# Patient Record
Sex: MALE | Race: WHITE | NOT HISPANIC OR LATINO | URBAN - METROPOLITAN AREA
[De-identification: names, ages, dates, MRNs, and addresses within clinical notes are randomized per-mention and may not be internally consistent; named-entity substitution may affect disease eponyms.]

---

## 2022-02-07 ENCOUNTER — EMERGENCY (EMERGENCY)
Facility: HOSPITAL | Age: 27
LOS: 1 days | Discharge: HOME | End: 2022-02-07
Attending: EMERGENCY MEDICINE | Admitting: EMERGENCY MEDICINE
Payer: COMMERCIAL

## 2022-02-07 VITALS
SYSTOLIC BLOOD PRESSURE: 151 MMHG | DIASTOLIC BLOOD PRESSURE: 92 MMHG | HEART RATE: 99 BPM | RESPIRATION RATE: 18 BRPM | TEMPERATURE: 98 F | WEIGHT: 130.07 LBS | OXYGEN SATURATION: 100 %

## 2022-02-07 VITALS
HEART RATE: 80 BPM | SYSTOLIC BLOOD PRESSURE: 141 MMHG | RESPIRATION RATE: 17 BRPM | OXYGEN SATURATION: 98 % | DIASTOLIC BLOOD PRESSURE: 88 MMHG

## 2022-02-07 DIAGNOSIS — R00.2 PALPITATIONS: ICD-10-CM

## 2022-02-07 DIAGNOSIS — R00.0 TACHYCARDIA, UNSPECIFIED: ICD-10-CM

## 2022-02-07 DIAGNOSIS — Z86.79 PERSONAL HISTORY OF OTHER DISEASES OF THE CIRCULATORY SYSTEM: ICD-10-CM

## 2022-02-07 LAB
ALBUMIN SERPL ELPH-MCNC: 4.6 G/DL — SIGNIFICANT CHANGE UP (ref 3.3–5)
ALP SERPL-CCNC: 100 U/L — SIGNIFICANT CHANGE UP (ref 40–120)
ALT FLD-CCNC: SIGNIFICANT CHANGE UP U/L (ref 10–45)
ANION GAP SERPL CALC-SCNC: 13 MMOL/L — SIGNIFICANT CHANGE UP (ref 5–17)
AST SERPL-CCNC: SIGNIFICANT CHANGE UP U/L (ref 10–40)
BASOPHILS # BLD AUTO: 0.07 K/UL — SIGNIFICANT CHANGE UP (ref 0–0.2)
BASOPHILS NFR BLD AUTO: 0.7 % — SIGNIFICANT CHANGE UP (ref 0–2)
BILIRUB SERPL-MCNC: 0.7 MG/DL — SIGNIFICANT CHANGE UP (ref 0.2–1.2)
BUN SERPL-MCNC: 8 MG/DL — SIGNIFICANT CHANGE UP (ref 7–23)
CALCIUM SERPL-MCNC: 9.5 MG/DL — SIGNIFICANT CHANGE UP (ref 8.4–10.5)
CHLORIDE SERPL-SCNC: 104 MMOL/L — SIGNIFICANT CHANGE UP (ref 96–108)
CO2 SERPL-SCNC: 25 MMOL/L — SIGNIFICANT CHANGE UP (ref 22–31)
CREAT SERPL-MCNC: 0.77 MG/DL — SIGNIFICANT CHANGE UP (ref 0.5–1.3)
EOSINOPHIL # BLD AUTO: 0.09 K/UL — SIGNIFICANT CHANGE UP (ref 0–0.5)
EOSINOPHIL NFR BLD AUTO: 0.9 % — SIGNIFICANT CHANGE UP (ref 0–6)
GLUCOSE SERPL-MCNC: 87 MG/DL — SIGNIFICANT CHANGE UP (ref 70–99)
HCT VFR BLD CALC: 50.1 % — HIGH (ref 39–50)
HGB BLD-MCNC: 17.3 G/DL — HIGH (ref 13–17)
IMM GRANULOCYTES NFR BLD AUTO: 0.3 % — SIGNIFICANT CHANGE UP (ref 0–1.5)
LYMPHOCYTES # BLD AUTO: 1.29 K/UL — SIGNIFICANT CHANGE UP (ref 1–3.3)
LYMPHOCYTES # BLD AUTO: 12.6 % — LOW (ref 13–44)
MCHC RBC-ENTMCNC: 31.7 PG — SIGNIFICANT CHANGE UP (ref 27–34)
MCHC RBC-ENTMCNC: 34.5 GM/DL — SIGNIFICANT CHANGE UP (ref 32–36)
MCV RBC AUTO: 91.9 FL — SIGNIFICANT CHANGE UP (ref 80–100)
MONOCYTES # BLD AUTO: 0.48 K/UL — SIGNIFICANT CHANGE UP (ref 0–0.9)
MONOCYTES NFR BLD AUTO: 4.7 % — SIGNIFICANT CHANGE UP (ref 2–14)
NEUTROPHILS # BLD AUTO: 8.28 K/UL — HIGH (ref 1.8–7.4)
NEUTROPHILS NFR BLD AUTO: 80.8 % — HIGH (ref 43–77)
NRBC # BLD: 0 /100 WBCS — SIGNIFICANT CHANGE UP (ref 0–0)
PLATELET # BLD AUTO: 348 K/UL — SIGNIFICANT CHANGE UP (ref 150–400)
POTASSIUM SERPL-MCNC: SIGNIFICANT CHANGE UP MMOL/L (ref 3.5–5.3)
POTASSIUM SERPL-SCNC: SIGNIFICANT CHANGE UP MMOL/L (ref 3.5–5.3)
PROT SERPL-MCNC: 7.7 G/DL — SIGNIFICANT CHANGE UP (ref 6–8.3)
RBC # BLD: 5.45 M/UL — SIGNIFICANT CHANGE UP (ref 4.2–5.8)
RBC # FLD: 13.1 % — SIGNIFICANT CHANGE UP (ref 10.3–14.5)
SODIUM SERPL-SCNC: 142 MMOL/L — SIGNIFICANT CHANGE UP (ref 135–145)
TROPONIN T SERPL-MCNC: <0.01 NG/ML — SIGNIFICANT CHANGE UP (ref 0–0.01)
WBC # BLD: 10.24 K/UL — SIGNIFICANT CHANGE UP (ref 3.8–10.5)
WBC # FLD AUTO: 10.24 K/UL — SIGNIFICANT CHANGE UP (ref 3.8–10.5)

## 2022-02-07 PROCEDURE — 80053 COMPREHEN METABOLIC PANEL: CPT

## 2022-02-07 PROCEDURE — 99283 EMERGENCY DEPT VISIT LOW MDM: CPT

## 2022-02-07 PROCEDURE — 85025 COMPLETE CBC W/AUTO DIFF WBC: CPT

## 2022-02-07 PROCEDURE — 93005 ELECTROCARDIOGRAM TRACING: CPT

## 2022-02-07 PROCEDURE — 36415 COLL VENOUS BLD VENIPUNCTURE: CPT

## 2022-02-07 PROCEDURE — 99285 EMERGENCY DEPT VISIT HI MDM: CPT

## 2022-02-07 PROCEDURE — 84484 ASSAY OF TROPONIN QUANT: CPT

## 2022-02-07 RX ORDER — SODIUM CHLORIDE 9 MG/ML
1000 INJECTION INTRAMUSCULAR; INTRAVENOUS; SUBCUTANEOUS ONCE
Refills: 0 | Status: COMPLETED | OUTPATIENT
Start: 2022-02-07 | End: 2022-02-07

## 2022-02-07 RX ADMIN — SODIUM CHLORIDE 1000 MILLILITER(S): 9 INJECTION INTRAMUSCULAR; INTRAVENOUS; SUBCUTANEOUS at 18:00

## 2022-02-07 NOTE — ED PROVIDER NOTE - NSFOLLOWUPINSTRUCTIONS_ED_ALL_ED_FT
Follow up with cardiologist recommended below.    Stay hydrated and eat well balanced meals.    Return to ED with worsening symptoms or other concerns.          Heart Palpitations    WHAT YOU NEED TO KNOW:    Heart palpitations are feelings that your heart races, jumps, throbs, or flutters. You may feel extra beats, no beats for a short time, or skipped beats. You may have these feelings in your chest, throat, or neck. They may happen when you are sitting, standing, or lying. Heart palpitations may be frightening, but are usually not caused by a serious problem.     DISCHARGE INSTRUCTIONS:    Call 911 or have someone else call for any of the following:   •You have any of the following signs of a heart attack: ?Squeezing, pressure, or pain in your chest      ?You may also have any of the following: ?Discomfort or pain in your back, neck, jaw, stomach, or arm      ?Shortness of breath      ?Nausea or vomiting      ?Lightheadedness or a sudden cold sweat        •You have any of the following signs of a stroke: ?Numbness or drooping on one side of your face       ?Weakness in an arm or leg      ?Confusion or difficulty speaking      ?Dizziness, a severe headache, or vision loss      •You faint or lose consciousness.       Seek care immediately if:   •Your palpitations happen more often or last longer than usual.       •You have palpitations and shortness of breath, nausea, sweating, or dizziness.       Contact your healthcare provider if:   •You have questions or concerns about your condition or care.          Follow up with your healthcare provider as directed: You may need to follow up with a cardiologist. You may need tests to check for heart problems that cause palpitations. Write down your questions so you remember to ask them during your visits.     Keep a record: Write down when your palpitations start and stop, what you were doing when they started, and your symptoms. Keep track of what you ate or drank within a few hours of your palpitations. Include anything that seemed to help your symptoms, such as lying down or holding your breath. This record will help you and your healthcare provider learn what triggers your palpitations. Bring this record with you to your follow up visits.    Help prevent heart palpitations:   •Manage stress and anxiety. Find ways to relax such as listening to music, meditating, or doing yoga. Exercise can also help decrease stress and anxiety. Talk to someone you trust about your stress or anxiety. You can also talk to a therapist.       •Get plenty of sleep every night. Ask your healthcare provider how much sleep you need each night.       •Do not drink caffeine or alcohol. Caffeine and alcohol can make your palpitations worse. Caffeine is found in soda, coffee, tea, chocolate, and drinks that increase your energy.       •Do not smoke. Nicotine and other chemicals in cigarettes and cigars may damage your heart and blood vessels. Ask your healthcare provider for information if you currently smoke and need help to quit. E-cigarettes or smokeless tobacco still contain nicotine. Talk to your healthcare provider before you use these products.       •Do not use illegal drugs. Talk to your healthcare provider if you use illegal drugs and want help to quit.          © Copyright [employer] 2022           back to top                          © Copyright [employer] 2022

## 2022-02-07 NOTE — ED PROVIDER NOTE - PATIENT PORTAL LINK FT
You can access the FollowMyHealth Patient Portal offered by [facility] by registering at the following website: [email or web address]. By joining [employer]’s FollowMyHealth portal, you will also be able to view your health information using other applications (apps) compatible with our system.

## 2022-02-07 NOTE — ED PROVIDER NOTE - MUSCULOSKELETAL, MLM
Spine appears normal, range of motion is not limited, no muscle or joint tenderness, anterior R knee swelling, no distal neurovascular deficit, no calf tenderness.
Discharged

## 2022-02-07 NOTE — ED ADULT NURSE NOTE - NSIMPLEMENTINTERV_GEN_ALL_ED
Wound Care: Petrolatum Implemented All Universal Safety Interventions:  [place] to call system. Call bell, personal items and telephone within reach. Instruct patient to call for assistance. Room bathroom lighting operational. Non-slip footwear when patient is off stretcher. Physically safe environment: no spills, clutter or unnecessary equipment. Stretcher in lowest position, wheels locked, appropriate side rails in place.

## 2022-02-07 NOTE — ED PROVIDER NOTE - CARE PROVIDER_API CALL
Ad Hong  CARDIOLOGY  [address] [place] [address]  Phone: [phone]  Fax: [phone]  Follow Up Time:

## 2022-02-07 NOTE — ED ADULT TRIAGE NOTE - CHIEF COMPLAINT QUOTE
pt was at home and felt his heart started to race suddenly , happened 3 times , no CP /SOB , denies any recent alcohol/drug use

## 2022-02-07 NOTE — ED PROVIDER NOTE - OBJECTIVE STATEMENT
26 y/o M pt with PMHx of SVT 4years ago presents to ED c/o palpitations today. Pt relates his first time having SVT was when he was in college and started to have rapid heart rate; pt was taken by EMS to hospital where it was resolved. He had wore a Holter with no results, and it was judged most likely to be SVT, although it hasn't happened since. Today, pt was at work, sitting at his desk, when he had sudden onset of rapid heart rate that lasted 10min then resolved, and it is completely resolved by the time pt arrived to ED. He has recent surgery to his R knee ACL, but denies swelling, pleuritic chest pain, shortness of breath, hx of PE or CAD, or any other acute complaints. Pt occasionally drinks, but denies smoking or any drug use.

## 2022-02-07 NOTE — ED PROVIDER NOTE - CARE PLAN
[facility] you [facility]    Interested in decreasing your wait time in the Walk-in/Urgent Care Clinic?  Same day reservations can now be made on line.  Go to [email or web address] to see the wait times at each [facility] and to make a reservation at the site that is most convenient for you. We will do our best to honor your reservation time, but please understand that wait times are subject to change once you arrive at the clinic.    It is difficult to recognize all elements of any illness or injury in a single visit. The examination, treatment, and x-rays received are on a preliminary basis only. A radiologist will also review your x-rays for final reading and you will be notified if the final reading is different than the preliminary reading. Call your primary care provider if you have questions or problems before your next appointment.  If symptoms worsen or do not resolve please follow-up with your Primary Care Provider (PCP), Urgent Care or the nearest  Emergency Room for emergency symptoms.  If you are unsure of whom to follow up with, call [phone] and ask to speak with either your PCP, the Urgent Care Nurse or the on-call provider if you are calling after hours.      If you are referred to a specialist or scheduled for a test, our referrals department will call you with your appointment date and time within 3 business days. If you have not heard from them in this time frame, please call [phone] and ask for the referrals department.     Test results: Unless otherwise instructed, you should be notified of test results within one week. Please call our office if you do not hear from us within this time frame at [phone].   Hours:   Sunday-Saturday:  8:00 am to 8:00 pm  Holidays: 8:00 am to 4:00 pm  Help us to grow our quality of service! We want to improve - and you can help!  You may receive a survey in the  mail or via e-mail. This is your opportunity to tell us what excellent service you received, and where we could use improvement.  We value your input!     Thank you again for visiting [facility].  Tammy L Schladweiler, NP  FACT SHEET FOR PATIENTS, PARENTS AND CAREGIVERS  EMERGENCY USE AUTHORIZATION (EUA) OF REGEN-COVTM  (casirivimab and imdevimab) FOR CORONAVIRUS DISEASE 2019 (COVID-19)  You are being given a medicine called REGEN-COV (casirivimab and imdevimab) for the  treatment or post-exposure prevention of coronavirus disease 2019 (COVID-19). SARS-CoV-2  is the virus that causes COVID-19. This Fact Sheet contains information to help you understand  the potential risks and potential benefits of taking REGEN-COV.  Receiving REGEN-COV may benefit certain people with COVID-19 and may help prevent  certain people who have been exposed to someone who is infected with SARS-CoV-2 from  getting SARS-CoV-2 infection, or may prevent certain people who are at high risk of exposure to  someone who is infected with SARS-CoV-2 from getting SARS-CoV-2 infection.  Read this Fact Sheet for information about REGEN-COV. Talk to your healthcare provider if  you have questions. It is your choice to receive REGEN-COV or stop at any time.  WHAT IS COVID-19?  COVID-19 is caused by a virus called a coronavirus, SARS-CoV-2. People can get COVID-19  through contact with another person who has the virus.  COVID-19 illnesses have ranged from very mild (including some with no reported symptoms) to  severe, including illness resulting in death. While information so far suggests that most  COVID-19 illness is mild, serious illness can happen and may cause some of your other medical  conditions to become worse. People of all ages with severe, long-lasting (chronic) medical  conditions like heart disease, lung disease, and diabetes, for example, and other conditions  including obesity, seem to be at higher risk of being  hospitalized for COVID-19. Older age, with  or without other conditions, also places people at higher risk of being hospitalized for COVID19.  WHAT ARE THE SYMPTOMS OF COVID-19?  The symptoms of COVID-19 include fever, cough, and shortness of breath, which may appear 2  to 14 days after exposure. Serious illness including breathing problems can occur and may cause  your other medical conditions to become worse.  WHAT IS REGEN-COV (casirivimab and imdevimab)?  REGEN-COV is an investigational medicine used in adults and adolescents (12 years of age and  older who weigh at least 88 pounds (40 kg)) who are at high risk for severe COVID-19,  including hospitalization or death for:  • treatment of mild to moderate symptoms of COVID-19  • post-exposure prevention of COVID-19 in persons who are:  o not fully vaccinated against COVID-19 (Individuals are considered to be fully  vaccinated 2 weeks after their second vaccine dose in a 2-dose series [such as the  Pfizer or Moderna vaccines], or 2 weeks after a single-dose vaccine [such as  [employer] Jose Manuel vaccine]), or,  o are not expected to build up enough of an immune response to the complete  COVID-19 vaccination (for example, someone with immunocompromising   Page 2 of 5  conditions, including someone who is taking immunosuppressive medications),  and  - have been exposed to someone who is infected with SARS-CoV-2. Close  contact with someone who is infected with SARS-CoV-2 is defined as  being within 6 feet for a total of 15 minutes or more, providing care at  home to someone who is sick, having direct physical contact with the  person (hugging or kissing, for example), sharing eating or drinking  utensils, or being exposed to respiratory droplets from an infected person  (sneezing or coughing, for example). For additional details, go to  https://www.cdc.gov/coronavirus/2019-ncov/if-you-aresick/quarantine.html, or  - someone who is at high risk of being exposed  to someone who is infected  with SARS-CoV-2 because of occurrence of SARS-CoV-2 infection in  other individuals in the same institutional setting (for example, as nursing  homes, prisons,).  REGEN-COV is investigational because it is still being studied. There is limited information  known about the safety and effectiveness of using REGEN-COV to treat people with COVID-19  or to prevent COVID-19 in people who are at high risk of being exposed to someone who is  infected with SARS-CoV-2. REGEN-COV is not authorized for pre-exposure prophylaxis for  prevention of COVID-19.  The FDA has authorized the emergency use of REGEN-COV for the treatment of COVID-19  and the post-exposure prevention of COVID-19 under an Emergency Use Authorization (EUA).  For more information on EUA, see the “What is an Emergency Use Authorization (EUA)?”  section at the end of this Fact Sheet.  WHO SHOULD NOT TAKE REGEN-COV?  Do not take REGEN-COV if you have had a severe allergic reaction to REGEN-COV.  WHAT SHOULD I TELL MY HEALTH CARE PROVIDER BEFORE I RECEIVE  REGEN-COV?  Tell your healthcare provider about all of your medical conditions, including if you:  • Have any allergies  • Have had a severe allergic reaction including anaphylaxis to REGEN-COV previously  • Have received a COVID-19 vaccine.  • Have any serious illnesses  • Are pregnant or plan to become pregnant  • Are breastfeeding or plan to breastfeed  • Are taking any medications (prescription, over-the-counter, vitamins, and herbal  products)  HOW WILL I RECEIVE REGEN-COV (casirivimab and imdevimab)?  • REGEN-COV consists of two investigational medicines, casirivimab and imdevimab,  given together at the same time through a vein (intravenous or IV) or injected in the   Page 3 of 5  tissue just under the skin (subcutaneous injections). Your healthcare provider will  determine the most appropriate way for you to be given REGEN-COV.  • Treatment: If you are receiving an  intravenous infusion, the infusion will take 20 to 50  minutes or longer. Your healthcare provider will determine the duration of your infusion.  o If your healthcare provider determines that you are unable to receive REGENCOV as an intravenous infusion which would lead to a delay in treatment, then as  an alternative, REGEN-COV can be given in the form of subcutaneous injections.  If you are receiving subcutaneous injections, your dose will be provided as  multiple injections given in separate locations around the same time.  • Post-exposure prevention: If you are receiving subcutaneous injections, your dose will  be provided as multiple injections given in separate locations around the same time. If  you are receiving an intravenous infusion, the infusion will take 20 to 50 minutes or  longer.  o After the initial dose, if your healthcare provider determines that you need to  receive additional doses of REGEN-COV for ongoing protection, the additional  intravenous or subcutaneous doses would be administered monthly.  WHAT ARE THE IMPORTANT POSSIBLE SIDE EFFECTS OF REGEN-COV  (casirivimab and imdevimab)?  Possible side effects of REGEN-COV are:  • Allergic reactions. Allergic reactions can happen during and after infusion or injection of  REGEN-COV. Tell your healthcare provider right away or seek immediate medical  attention if you get any of the following signs and symptoms of allergic reactions: fever,  chills, nausea, headache, shortness of breath, low or high blood pressure, rapid or slow  heart rate, chest discomfort or pain, weakness, confusion, feeling tired, wheezing,  swelling of your lips, face, or throat, rash including hives, itching, muscle aches, feeling  faint, dizziness and sweating. These reactions may be severe or life threatening.  • Worsening symptoms after treatment: You may experience new or worsening symptoms  after infusion or injection, including fever, difficulty breathing, rapid or slow  heart rate,  tiredness, weakness or confusion. If these symptoms occur, contact your healthcare  provider or seek immediate medical attention as some of these symptoms have required  hospitalization. It is unknown if these symptoms are related to treatment or are due to the  progression of COVID-19.  The side effects of getting any medicine by vein may include brief pain, bleeding, bruising of the  skin, soreness, swelling, and possible infection at the infusion site. The side effects of getting any  medicine by subcutaneous injection may include pain, bruising of the skin, soreness, swelling,  and possible infection at the injection site.  These are not all the possible side effects of REGEN-COV. Not a lot of people have been given  REGEN-COV. Serious and unexpected side effects may happen. REGEN-COV is still being  studied so it is possible that all of the risks are not known at this time.  It is possible that REGEN-COV could interfere with your body's own ability to fight off a future  infection of SARS-CoV-2. Similarly, REGEN-COV may reduce your body’s immune response  to a vaccine for SARS-CoV-2. Specific studies have not been conducted to address these  possible risks. Talk to your healthcare provider if you have any questions.  Page 4 of 5  WHAT OTHER TREATMENT CHOICES ARE THERE?  Like REGEN-COV (casirivimab and imdevimab), FDA may allow for the emergency use of  other medicines to treat people with COVID-19. Go to https://www.fda.gov/emergencypreparedness-and-response/mcm-legal-regulatory-and-policy-framework/emergency-useauthorization for information on the emergency use of other medicines that are not approved by  FDA that are used to treat people with COVID-19. Your healthcare provider may talk with you  about clinical trials you may be eligible for.  It is your choice to be treated or not to be treated with REGEN-COV. Should you decide not to  receive REGEN-COV or stop it at any time, it will not change  your standard medical care.  WHAT OTHER PREVENTION CHOICES ARE THERE?  Vaccines to prevent COVID-19 are also available under Emergency Use Authorization. Use of  REGEN-COV does not replace vaccination against COVID-19. REGEN-COV is not authorized  for pre-exposure prophylaxis for prevention of COVID-19.  WHAT IF I AM PREGNANT OR BREASTFEEDING?  There is limited experience using REGEN-COV (casirivimab and imdevimab) in pregnant  women or breastfeeding mothers. For a mother and unborn baby, the benefit of receiving  REGEN-COV may be greater than the risk of using the product. If you are pregnant or  breastfeeding, discuss your options and specific situation with your healthcare provider.  HOW DO I REPORT SIDE EFFECTS WITH REGEN-COV (casirivimab and  imdevimab)?  Tell your healthcare provider right away if you have any side effect that bothers you or does not  go away.  Report side effects to FDA MedWatch at www.fda.gov/medwatch or call 5-352-IYS-1677 or  call 1-124.386.9006.  HOW CAN I LEARN MORE?  • Ask your health care provider.  • Visit www.[email or web address]  • Visit https://www.dozhb14xtmdrzawhjuegvxmcae.nih.gov/  • Contact your local or state public health department.  WHAT IS AN EMERGENCY USE AUTHORIZATION (EUA)?  The [place] FDA has made REGEN-COV (casirivimab and imdevimab) available under  an emergency access mechanism called an EUA. The EUA is supported by a [place] of [place]  and Human Service (HHS) declaration that circumstances exist to justify the emergency use of  drugs and biological products during the COVID-19 pandemic.  Page 5 of 5  REGEN-COV has not undergone the same type of review as an FDA-approved product. In  issuing an EUA under the COVID-19 public health emergency, the FDA must determine, among  other things, that based on the totality of scientific evidence available, it is reasonable to believe  that the product may be effective for diagnosing, treating, or preventing COVID-19, or a  serious  or life-threatening disease or condition caused by COVID-19; that the known and potential  benefits of the product, when used to diagnose, treat, or prevent such disease or condition,  outweigh the known and potential risks of such product; and that there are no adequate, approved  and available alternatives. All of these criteria must be met to allow for the medicine to be used  in the treatment of COVID-19 or prevention of COVID-19 during the COVID-19 pandemic.  The EUA for REGEN-COV is in effect for the duration of the COVID-19 declaration justifying  emergency use of these products, unless terminated or revoked (after which the products may no  longer be used).  Manufactured by:  [employer]  [address] [place] [id number]  ©2021 [employer] All rights reserved.  Revised: 07/2021      What is the Covid-19 Coronavirus?        Covid-19 is a new strain of the common cold virus called Coronavirus.  It has spread quickly around the world and has been found in most countries.  It has been considered a national emergency due to its fast spread and ability to make older people and those with diabetes, lung disease, and heart disease very sick.    What are the symptoms?    Fever, Cough, and Shortness of Breath are the 3 most common symptoms most patients with Covid-19 are experiencing.  Usually symptoms start 2-14 days after exposure to the virus.  Other symptoms can be similar to the common cold such as nasal congestion, runny nose, headache, and sore throat.     When should I seek medical care?  If you or your loved one is experiencing only the main three symptoms mentioned above, call the [facility] hotline at [phone] for advice and direction on what to do next.  Most young and healthy people with mild symptoms are able to stay home and treat symptomatically with the below treatment recommendations.  Most people will not need to be  tested and our hotline and clinics can help you determine if you need testing.      If you or a loved one experiences any of the following emergency warning signs seek care at your nearest emergency department.  • Difficulty breathing or shortness of breath that doesn't improve  • Persistent pain or pressure in the chest  • New confusion or inability to wake up  • Blue lips or face    How do I get it and how do I prevent it?  We believe the virus is spread from person to person in close contact (within 6 feet) via respiratory droplets from a sneeze or cough.  Kissing and touching infected surfaces with hand-to-mouth activity afterwards may also help spread the virus.      There is no vaccine to help prevent this virus.  Therefore, good hygiene, isolation, and living a healthy lifestyle is the best way to prevent infection as well as severe symptoms. Below are ways you can help prevent infection and the spread of the virus:  • Wash your hands (soap and water) regularly, especially before meals and after coming in contact with someone  • Cover your cough's and sneezes with your elbow or a tissue  • Avoid contact with people who are showing the common cold symptoms mentioned above  • Avoid large gatherings of 25+ people and only plan for necessary trips to stores and public places  • Quit smoking tobacco and limit alcohol use to a drink per day maximum due to the immune suppression of these habits  • Walk outside (if possible) or get other low intensity exercise daily as this has been shown to boost immunity  • Eat 2-3 fruits, 5-7 vegetables, a 1/2 cup of legumes, a cup of whole grains, and a handful of nuts every day along with 3-4oz of lean meat or fish every few days.  Limit sugar-filled, processed, and fried foods.  This approach to nutrition has shown to support the immune system best.    • Drink 60-75oz of water daily and avoid sodas, sweetened teas, juices, and other sugar containing drinks which have been shown  to suppress the immune system  • Sleep 7-8 hours every night.  If you have trouble falling or staying asleep, discuss further with your primary care doctor.      What is the treatment for Covid-19?  There is no specific treatment for this Covid-19 infection other than supportive treatments we recommend for the common cold.  Currently there is some concern over treating a low-grade fever (under 102F), so it might be best to allow the fever to take its course as long as you or your loved one can manage the discomfort.  Integrative treatments that can help improve symptoms and potentially reduce severity of the illness include:  • Normal Saline Nasal Sprays/Sinus Rinse/Neti Pot: Use 1 to 2 times per day to help with nasal congestion, dryness, postnasal drip, and runny nose. Ask your primary care provider for more information on these rinses.   • Humidifiers/Vaporizers: Adds moisture to the air, which helps ease coughing and congestion. Mucus tends to pull in the extra moisture, which thins it out and makes it easier to expel from the body.  Adding essential oils to a diffuser is often helpful, try Eucalyptus and Tea Tree Oil.  • Vicks VapoRub, Mentholatum: Contain a mixture of camphor, menthol, and eucalyptus for cough and congestion. When these products are inhaled, they create a local anesthetic sensation and a sense of improved airflow.   • Warm Steam Showers/Baths: The warm mist from the steamy bathroom relaxes the airways, loosens mucus, and allows for easier breathing. A nice big pot of soup on the stove helps too!   • Honey: Helps to relieve cough and relieves throat irritation. Use 1/2 to 2 teaspoons (tsp).  It's great to add to hot (decaffeinated) tea=more humidity! Warning:  Do not give honey to children under one year old.   • Gargle with warm salt water: Helps relieve sore throat.  Mix 1/4 to 1/2 teaspoon (tsp) salt with 1 cup (8 ounces) of warm water.   • Vitamin D3: The vitamin is needed to boost our  immune system and help our bodies fight off infection.  Extra supplements during the winter and for prevention of illness.  For Prevention try 5,000-10,000 international units daily. If however, you are experiencing Covid-19 symptoms mentioned above, it is currently recommended you STOP Vitamin D supplementation due to potential to worsen symptoms.  • Use Gravity: Elevating the head above the heart helps decrease congestion, which is primarily caused by swollen blood vessels in the lining of the nose.  • Consider Other Supplements:   o Vitamin C: 500-3000mg daily  o Probiotics: 30-50 Billion live organisms daily with food  o Zinc: 25-50mg two times daily  o Oscillococcinum®: Take as package recommends        1 Principal Discharge DX:	Palpitations

## 2022-02-07 NOTE — ED ADULT NURSE NOTE - DISCHARGE DATE/TIME
07-Feb-2022 19:38 Anesthesia Type: 1% lidocaine with 1:100,000 epinephrine and a 1:10 solution of 8.4% sodium bicarbonate

## 2022-02-07 NOTE — ED ADULT NURSE NOTE - OBJECTIVE STATEMENT
Pt was at home and felt his heart race suddenly up to 180. Pt states by the time EMS had come in resolved. Hx of SVT 4 years ago. Pt endorses not drinking enough today, denies any active cp or sob. L 20g in place, IVF infusing.     pt was at home and felt his heart started to race suddenly , happened 3 times , no CP /SOB , denies any recent alcohol/drug use

## 2022-02-15 PROBLEM — Z00.00 ENCOUNTER FOR PREVENTIVE HEALTH EXAMINATION: Status: ACTIVE | Noted: 2022-02-15

## 2022-02-18 ENCOUNTER — APPOINTMENT (OUTPATIENT)
Dept: HEART AND VASCULAR | Facility: CLINIC | Age: 27
End: 2022-02-18
Payer: COMMERCIAL

## 2022-02-18 ENCOUNTER — NON-APPOINTMENT (OUTPATIENT)
Age: 27
End: 2022-02-18

## 2022-02-18 VITALS
DIASTOLIC BLOOD PRESSURE: 76 MMHG | SYSTOLIC BLOOD PRESSURE: 148 MMHG | HEART RATE: 68 BPM | HEIGHT: 68 IN | WEIGHT: 137 LBS | OXYGEN SATURATION: 98 % | BODY MASS INDEX: 20.76 KG/M2 | TEMPERATURE: 98.2 F

## 2022-02-18 DIAGNOSIS — Z82.49 FAMILY HISTORY OF ISCHEMIC HEART DISEASE AND OTHER DISEASES OF THE CIRCULATORY SYSTEM: ICD-10-CM

## 2022-02-18 DIAGNOSIS — Z83.438 FAMILY HISTORY OF OTHER DISORDER OF LIPOPROTEIN METABOLISM AND OTHER LIPIDEMIA: ICD-10-CM

## 2022-02-18 DIAGNOSIS — Z78.9 OTHER SPECIFIED HEALTH STATUS: ICD-10-CM

## 2022-02-18 PROBLEM — I47.1 SUPRAVENTRICULAR TACHYCARDIA: Chronic | Status: ACTIVE | Noted: 2022-02-07

## 2022-02-18 PROCEDURE — 99203 OFFICE O/P NEW LOW 30 MIN: CPT

## 2022-02-18 PROCEDURE — 93000 ELECTROCARDIOGRAM COMPLETE: CPT

## 2022-02-23 LAB
ALBUMIN SERPL ELPH-MCNC: 4.9 G/DL
ALP BLD-CCNC: 96 U/L
ALT SERPL-CCNC: 40 U/L
ANION GAP SERPL CALC-SCNC: 13 MMOL/L
APO LP(A) SERPL-MCNC: 16.3 NMOL/L
AST SERPL-CCNC: 19 U/L
BASOPHILS # BLD AUTO: 0.06 K/UL
BASOPHILS NFR BLD AUTO: 1.1 %
BILIRUB SERPL-MCNC: 0.6 MG/DL
BUN SERPL-MCNC: 10 MG/DL
CALCIUM SERPL-MCNC: 10.1 MG/DL
CHLORIDE SERPL-SCNC: 100 MMOL/L
CHOLEST SERPL-MCNC: 170 MG/DL
CO2 SERPL-SCNC: 25 MMOL/L
CREAT SERPL-MCNC: 0.76 MG/DL
EOSINOPHIL # BLD AUTO: 0.14 K/UL
EOSINOPHIL NFR BLD AUTO: 2.5 %
ESTIMATED AVERAGE GLUCOSE: 100 MG/DL
GLUCOSE SERPL-MCNC: 87 MG/DL
HBA1C MFR BLD HPLC: 5.1 %
HCT VFR BLD CALC: 49.7 %
HDLC SERPL-MCNC: 51 MG/DL
HGB BLD-MCNC: 16.2 G/DL
IMM GRANULOCYTES NFR BLD AUTO: 0 %
LDLC SERPL CALC-MCNC: 96 MG/DL
LYMPHOCYTES # BLD AUTO: 1.46 K/UL
LYMPHOCYTES NFR BLD AUTO: 25.9 %
MAN DIFF?: NORMAL
MCHC RBC-ENTMCNC: 30.9 PG
MCHC RBC-ENTMCNC: 32.6 GM/DL
MCV RBC AUTO: 94.8 FL
MONOCYTES # BLD AUTO: 0.41 K/UL
MONOCYTES NFR BLD AUTO: 7.3 %
NEUTROPHILS # BLD AUTO: 3.56 K/UL
NEUTROPHILS NFR BLD AUTO: 63.2 %
NONHDLC SERPL-MCNC: 119 MG/DL
PLATELET # BLD AUTO: 373 K/UL
POTASSIUM SERPL-SCNC: 4.4 MMOL/L
PROT SERPL-MCNC: 7.6 G/DL
RBC # BLD: 5.24 M/UL
RBC # FLD: 12.8 %
SODIUM SERPL-SCNC: 139 MMOL/L
T3FREE SERPL-MCNC: 3.7 PG/ML
T4 FREE SERPL-MCNC: 1.3 NG/DL
TRIGL SERPL-MCNC: 117 MG/DL
TSH SERPL-ACNC: 1.67 UIU/ML
WBC # FLD AUTO: 5.63 K/UL

## 2022-03-02 ENCOUNTER — APPOINTMENT (OUTPATIENT)
Dept: HEART AND VASCULAR | Facility: CLINIC | Age: 27
End: 2022-03-02
Payer: COMMERCIAL

## 2022-03-02 PROCEDURE — 93306 TTE W/DOPPLER COMPLETE: CPT

## 2022-03-18 ENCOUNTER — APPOINTMENT (OUTPATIENT)
Dept: HEART AND VASCULAR | Facility: CLINIC | Age: 27
End: 2022-03-18
Payer: COMMERCIAL

## 2022-03-18 PROCEDURE — 99442: CPT

## 2022-04-07 PROBLEM — Z83.438 FAMILY HISTORY OF HYPERLIPIDEMIA: Status: ACTIVE | Noted: 2022-04-07

## 2022-04-07 PROBLEM — Z82.49 FAMILY HISTORY OF ATRIAL FIBRILLATION: Status: ACTIVE | Noted: 2022-04-07

## 2022-04-07 PROBLEM — Z82.49 FAMILY HISTORY OF HYPERTENSION: Status: ACTIVE | Noted: 2022-04-07

## 2022-04-07 PROBLEM — Z82.49 FAMILY HISTORY OF MITRAL VALVE PROLAPSE: Status: ACTIVE | Noted: 2022-04-07

## 2022-04-07 PROBLEM — Z78.9 CONSUMES ALCOHOL AT SOCIAL EVENTS: Status: ACTIVE | Noted: 2022-04-07

## 2022-04-07 NOTE — ASSESSMENT
[FreeTextEntry1] : SVT//presyncope\par - ECG wnl\par - check Event Monitor \par - TTE\par - labs today \par - further recommendations pending results \par \par Elevated BP\par - will repeat next visit\par - f/u TTE\par - ed done re Na restriction \par - further recommendations pending results \par \par RTC after

## 2022-04-07 NOTE — HISTORY OF PRESENT ILLNESS
[FreeTextEntry1] : 27M w/ h/o SVT who was recently seen in the [facility] ED for a sustained SVT in the setting of dehydration. States the SVT broke before he even fgot to the ER. States he was pre-syncopal for ~20 seconds but did not fall or experience LOC. NO associated .cp. No associated nausea, vomiting or diaphoresis. No new meds.\par \par ECG: NSR at 76 bpm, nl axis \par \par \par

## 2022-04-28 NOTE — HISTORY OF PRESENT ILLNESS
[FreeTextEntry1] : 27M w/ h/o SVT who was recently seen in the [facility] ED for a sustained SVT in the setting of dehydration. States the SVT broke before he even fgot to the ER. States he was pre-syncopal for ~20 seconds but did not fall or experience LOC. NO associated chest pain . No associated nausea, vomiting or diaphoresis. No new meds. Returns today via  visit for results of TTE and Event monitor. No new complaints. \par \par ECG: NSR at 76 bpm, nl axis \par \par \par

## 2022-04-28 NOTE — ASSESSMENT
[FreeTextEntry1] : SVT//presyncope\par - ECG wnl\par - 2/18/22 Event Monitor wnl\par - results discussed in detail with pt \par - 3/2/22 TTE c/w nl LV/RV fxn\par - labs reviewed, results discussed in detail, wnl\par \par \par Elevated BP\par - 3/2/22 TTE c/w mild LVH\par - results discussed in detail with pt \par - start losartan 25 qd, ed done re AE/SE\par - ed done re Na restriction \par - repeat TTE in 3 months  \par \par RTC 3 months\par Spent 13 minutes on telehealth/phone visit, all questions answered in detail.

## 2022-05-05 ENCOUNTER — TRANSCRIPTION ENCOUNTER (OUTPATIENT)
Age: 27
End: 2022-05-05

## 2022-05-25 ENCOUNTER — TRANSCRIPTION ENCOUNTER (OUTPATIENT)
Age: 27
End: 2022-05-25

## 2022-06-10 ENCOUNTER — APPOINTMENT (OUTPATIENT)
Dept: HEART AND VASCULAR | Facility: CLINIC | Age: 27
End: 2022-06-10

## 2022-06-10 PROCEDURE — 93306 TTE W/DOPPLER COMPLETE: CPT

## 2022-06-17 ENCOUNTER — APPOINTMENT (OUTPATIENT)
Dept: HEART AND VASCULAR | Facility: CLINIC | Age: 27
End: 2022-06-17

## 2022-06-17 VITALS
WEIGHT: 140 LBS | HEIGHT: 68 IN | BODY MASS INDEX: 21.22 KG/M2 | OXYGEN SATURATION: 97 % | TEMPERATURE: 97.8 F | SYSTOLIC BLOOD PRESSURE: 140 MMHG | HEART RATE: 120 BPM | DIASTOLIC BLOOD PRESSURE: 95 MMHG

## 2022-06-17 DIAGNOSIS — R03.0 ELEVATED BLOOD-PRESSURE READING, W/OUT DIAGNOSIS OF HYPERTENSION: ICD-10-CM

## 2022-06-17 PROCEDURE — 93000 ELECTROCARDIOGRAM COMPLETE: CPT

## 2022-06-17 PROCEDURE — 99214 OFFICE O/P EST MOD 30 MIN: CPT | Mod: 25

## 2022-06-21 ENCOUNTER — OUTPATIENT (OUTPATIENT)
Dept: OUTPATIENT SERVICES | Facility: HOSPITAL | Age: 27
LOS: 1 days | End: 2022-06-21
Payer: COMMERCIAL

## 2022-06-21 DIAGNOSIS — R07.9 CHEST PAIN, UNSPECIFIED: ICD-10-CM

## 2022-06-21 PROCEDURE — 93017 CV STRESS TEST TRACING ONLY: CPT

## 2022-12-01 NOTE — ASSESSMENT
[FreeTextEntry1] : SVT//presyncope\par - ECG wnl\par - 2/18/22 Event Monitor wnl\par - results discussed in detail with pt \par - 3/2/22 TTE c/w nl LV/RV fxn\par - labs reviewed, results discussed in detail, wnl\par \par \par HTN\par - uncontrolled, did not take meds this am (states he ran out)\par - 3/2/22 TTE c/w mild LVH\par - results discussed in detail with pt \par - started losartan 25 qd, ed done re AE/SE\par - ed done re Na restriction \par - repeat TTE done 6/10/22 now w/ resolution of LVH\par \par chest pain \par - TTE wnl\par - ETT\par \par RTC 6 months\par

## 2022-12-01 NOTE — HISTORY OF PRESENT ILLNESS
[FreeTextEntry1] : 27M w/ h/o SVT who was recently seen in the [facility] ED for a sustained SVT in the setting of dehydration. States the SVT broke before he even fgot to the ER. States he was pre-syncopal for ~20 seconds but did not fall or experience LOC. NO associated chest pain . No associated nausea, vomiting or diaphoresis. No new meds.  Event monitor wnl and TTE c/w LVH. Returns for 3 month f/u and and repeat TTE results after starting an ARB.  Now w/ complaints of chest pain on rest and exertion. No associated nausea, vomiting or diaphoresis. \par \par 6/17/22 Sinus tachycardia at 103 bpm with sinus arrhythmia and NS STC\par ECG: NSR at 76 bpm, nl axis \par \par \par

## 2022-12-25 ENCOUNTER — NON-APPOINTMENT (OUTPATIENT)
Age: 27
End: 2022-12-25

## 2022-12-30 ENCOUNTER — APPOINTMENT (OUTPATIENT)
Dept: HEART AND VASCULAR | Facility: CLINIC | Age: 27
End: 2022-12-30
Payer: COMMERCIAL

## 2022-12-30 ENCOUNTER — NON-APPOINTMENT (OUTPATIENT)
Age: 27
End: 2022-12-30

## 2022-12-30 VITALS
HEIGHT: 68 IN | SYSTOLIC BLOOD PRESSURE: 140 MMHG | HEART RATE: 75 BPM | BODY MASS INDEX: 22.13 KG/M2 | DIASTOLIC BLOOD PRESSURE: 80 MMHG | OXYGEN SATURATION: 98 % | WEIGHT: 146 LBS | TEMPERATURE: 98.1 F

## 2022-12-30 PROCEDURE — 93000 ELECTROCARDIOGRAM COMPLETE: CPT

## 2022-12-30 PROCEDURE — 99214 OFFICE O/P EST MOD 30 MIN: CPT | Mod: 25

## 2022-12-30 NOTE — ASSESSMENT
[FreeTextEntry1] : SVT//presyncope\par - ECG wnl\par - 2/18/22 Event Monitor wnl\par - results discussed in detail with pt \par - 3/2/22 TTE c/w nl LV/RV fxn\par - labs reviewed, results discussed in detail, wnl\par \par \par HTN\par - uncontrolled, states he has white coat HTN, home /80\par - 3/2/22 TTE c/w mild LVH\par - results discussed in detail with pt \par - started losartan 25 qd, ed done re AE/SE\par - ed done re Na restriction \par - repeat TTE done 6/10/22 w/ resolution of LVH\par \par chest pain - now progressive\par - TTE wnl\par - ETT wnl\par - check CCTA\par \par RTC afer\par

## 2022-12-30 NOTE — HISTORY OF PRESENT ILLNESS
[FreeTextEntry1] : 27M w/ h/o SVT who was seen in the [facility] ED for a sustained SVT in the setting of dehydration. States the SVT broke before he even got to the ER. States he was pre-syncopal for ~20 seconds but did not fall or experience LOC. NO associated chest pain . No associated nausea, vomiting or diaphoresis. No new meds.  Event monitor wnl and TTE c/w LVH. Returned for 6 month f/u and and c/o chest pain; subsequent TTE and ETT wnl. Returns for 6m f/u and now w/ complaints of chest pain on rest and exertion. No associated nausea, vomiting or diaphoresis. \par \par 12/30/22: ECG: NSR at 75 bpm, nl axis, IRBBB, rate variation \par 6/17/22 Sinus tachycardia at 103 bpm with sinus arrhythmia and NS STC\par ECG: NSR at 76 bpm, nl axis \par \par \par

## 2023-01-04 ENCOUNTER — APPOINTMENT (OUTPATIENT)
Dept: CT IMAGING | Facility: CLINIC | Age: 28
End: 2023-01-04
Payer: COMMERCIAL

## 2023-01-04 ENCOUNTER — OUTPATIENT (OUTPATIENT)
Dept: OUTPATIENT SERVICES | Facility: HOSPITAL | Age: 28
LOS: 1 days | End: 2023-01-04

## 2023-01-04 ENCOUNTER — RESULT REVIEW (OUTPATIENT)
Age: 28
End: 2023-01-04

## 2023-01-04 PROCEDURE — 75574 CT ANGIO HRT W/3D IMAGE: CPT | Mod: 26

## 2023-01-20 ENCOUNTER — APPOINTMENT (OUTPATIENT)
Dept: HEART AND VASCULAR | Facility: CLINIC | Age: 28
End: 2023-01-20
Payer: COMMERCIAL

## 2023-01-20 DIAGNOSIS — R55 SYNCOPE AND COLLAPSE: ICD-10-CM

## 2023-01-20 PROCEDURE — 99442: CPT

## 2023-01-20 NOTE — HISTORY OF PRESENT ILLNESS
[FreeTextEntry1] : 27M w/ h/o SVT who was seen in the [facility] ED for a sustained SVT in the setting of dehydration. States the SVT broke before he even got to the ER. States he was pre-syncopal for ~20 seconds but did not fall or experience LOC. NO associated chest pain . No associated nausea, vomiting or diaphoresis. No new meds.  Event monitor wnl and TTE c/w LVH. Returned for 6 month f/u and and c/o chest pain; subsequent TTE and ETT wnl. Returned for 6m f/u w/ complaints of progressive chest pain on rest and exertion. No associated nausea, vomiting or diaphoresis. Returns today via TH visit for results of his CCTA. No new complaints. \par \par 12/30/22: ECG: NSR at 75 bpm, nl axis, IRBBB, rate variation \par 6/17/22 Sinus tachycardia at 103 bpm with sinus arrhythmia and NS STC\par ECG: NSR at 76 bpm, nl axis \par \par \par

## 2023-02-21 ENCOUNTER — TRANSCRIPTION ENCOUNTER (OUTPATIENT)
Age: 28
End: 2023-02-21

## 2023-02-21 ENCOUNTER — NON-APPOINTMENT (OUTPATIENT)
Age: 28
End: 2023-02-21

## 2024-01-17 ENCOUNTER — TRANSCRIPTION ENCOUNTER (OUTPATIENT)
Age: 29
End: 2024-01-17

## 2024-01-19 ENCOUNTER — APPOINTMENT (OUTPATIENT)
Dept: HEART AND VASCULAR | Facility: CLINIC | Age: 29
End: 2024-01-19
Payer: COMMERCIAL

## 2024-01-19 ENCOUNTER — NON-APPOINTMENT (OUTPATIENT)
Age: 29
End: 2024-01-19

## 2024-01-19 VITALS
BODY MASS INDEX: 22.88 KG/M2 | DIASTOLIC BLOOD PRESSURE: 70 MMHG | TEMPERATURE: 98.3 F | SYSTOLIC BLOOD PRESSURE: 120 MMHG | HEART RATE: 73 BPM | HEIGHT: 68 IN | WEIGHT: 151 LBS | OXYGEN SATURATION: 98 %

## 2024-01-19 DIAGNOSIS — I47.10 SUPRAVENTRICULAR TACHYCARDIA, UNSPECIFIED: ICD-10-CM

## 2024-01-19 DIAGNOSIS — I11.9 HYPERTENSIVE HEART DISEASE W/OUT HEART FAILURE: ICD-10-CM

## 2024-01-19 DIAGNOSIS — R00.2 PALPITATIONS: ICD-10-CM

## 2024-01-19 DIAGNOSIS — R07.9 CHEST PAIN, UNSPECIFIED: ICD-10-CM

## 2024-01-19 PROCEDURE — G2211 COMPLEX E/M VISIT ADD ON: CPT

## 2024-01-19 PROCEDURE — 93000 ELECTROCARDIOGRAM COMPLETE: CPT

## 2024-01-19 PROCEDURE — 99214 OFFICE O/P EST MOD 30 MIN: CPT

## 2024-01-22 LAB
ALBUMIN SERPL ELPH-MCNC: 5.1 G/DL
ALP BLD-CCNC: 81 U/L
ALT SERPL-CCNC: 60 U/L
ANION GAP SERPL CALC-SCNC: 14 MMOL/L
APO LP(A) SERPL-MCNC: 27.7 NMOL/L
AST SERPL-CCNC: 155 U/L
BASOPHILS # BLD AUTO: 0.04 K/UL
BASOPHILS NFR BLD AUTO: 0.9 %
BILIRUB SERPL-MCNC: 0.8 MG/DL
BUN SERPL-MCNC: 12 MG/DL
CALCIUM SERPL-MCNC: 10.2 MG/DL
CHLORIDE SERPL-SCNC: 104 MMOL/L
CHOLEST SERPL-MCNC: 189 MG/DL
CO2 SERPL-SCNC: 26 MMOL/L
CREAT SERPL-MCNC: 0.87 MG/DL
EGFR: 121 ML/MIN/1.73M2
EOSINOPHIL # BLD AUTO: 0.08 K/UL
EOSINOPHIL NFR BLD AUTO: 1.9 %
ESTIMATED AVERAGE GLUCOSE: 94 MG/DL
GLUCOSE SERPL-MCNC: 86 MG/DL
HBA1C MFR BLD HPLC: 4.9 %
HCT VFR BLD CALC: 50.4 %
HDLC SERPL-MCNC: 55 MG/DL
HGB BLD-MCNC: 17.1 G/DL
IMM GRANULOCYTES NFR BLD AUTO: 0.2 %
LDLC SERPL CALC-MCNC: 117 MG/DL
LYMPHOCYTES # BLD AUTO: 1.35 K/UL
LYMPHOCYTES NFR BLD AUTO: 31.7 %
MAN DIFF?: NORMAL
MCHC RBC-ENTMCNC: 31.3 PG
MCHC RBC-ENTMCNC: 33.9 GM/DL
MCV RBC AUTO: 92.1 FL
MONOCYTES # BLD AUTO: 0.28 K/UL
MONOCYTES NFR BLD AUTO: 6.6 %
NEUTROPHILS # BLD AUTO: 2.5 K/UL
NEUTROPHILS NFR BLD AUTO: 58.7 %
NONHDLC SERPL-MCNC: 134 MG/DL
PLATELET # BLD AUTO: 342 K/UL
POTASSIUM SERPL-SCNC: 4.9 MMOL/L
PROT SERPL-MCNC: 7.8 G/DL
RBC # BLD: 5.47 M/UL
RBC # FLD: 12.8 %
SODIUM SERPL-SCNC: 143 MMOL/L
TRIGL SERPL-MCNC: 90 MG/DL
TSH SERPL-ACNC: 1.13 UIU/ML
WBC # FLD AUTO: 4.26 K/UL

## 2024-02-01 ENCOUNTER — APPOINTMENT (OUTPATIENT)
Dept: HEART AND VASCULAR | Facility: CLINIC | Age: 29
End: 2024-02-01
Payer: COMMERCIAL

## 2024-02-01 VITALS
HEART RATE: 67 BPM | BODY MASS INDEX: 22.88 KG/M2 | SYSTOLIC BLOOD PRESSURE: 150 MMHG | WEIGHT: 151 LBS | DIASTOLIC BLOOD PRESSURE: 80 MMHG | HEIGHT: 68 IN

## 2024-02-01 DIAGNOSIS — I10 ESSENTIAL (PRIMARY) HYPERTENSION: ICD-10-CM

## 2024-02-01 PROCEDURE — 93306 TTE W/DOPPLER COMPLETE: CPT

## 2024-02-01 PROCEDURE — 36415 COLL VENOUS BLD VENIPUNCTURE: CPT

## 2024-02-02 LAB
ALBUMIN SERPL ELPH-MCNC: 5 G/DL
ALP BLD-CCNC: 77 U/L
ALT SERPL-CCNC: 30 U/L
ANION GAP SERPL CALC-SCNC: 12 MMOL/L
AST SERPL-CCNC: 18 U/L
BILIRUB SERPL-MCNC: 0.6 MG/DL
BUN SERPL-MCNC: 10 MG/DL
CALCIUM SERPL-MCNC: 9.8 MG/DL
CHLORIDE SERPL-SCNC: 102 MMOL/L
CO2 SERPL-SCNC: 25 MMOL/L
CREAT SERPL-MCNC: 0.83 MG/DL
EGFR: 122 ML/MIN/1.73M2
GLUCOSE SERPL-MCNC: 94 MG/DL
POTASSIUM SERPL-SCNC: 4.6 MMOL/L
PROT SERPL-MCNC: 7.3 G/DL
SODIUM SERPL-SCNC: 139 MMOL/L

## 2024-02-05 ENCOUNTER — APPOINTMENT (OUTPATIENT)
Dept: HEART AND VASCULAR | Facility: CLINIC | Age: 29
End: 2024-02-05
Payer: COMMERCIAL

## 2024-02-05 PROCEDURE — 99442: CPT | Mod: 93

## 2024-02-05 PROCEDURE — G2211 COMPLEX E/M VISIT ADD ON: CPT | Mod: NC,1L

## 2024-02-12 PROBLEM — I10 BENIGN ESSENTIAL HTN: Status: ACTIVE | Noted: 2022-06-17

## 2024-02-23 PROBLEM — R00.2 HEART PALPITATIONS: Status: ACTIVE | Noted: 2022-02-18

## 2024-02-23 PROBLEM — I11.9 LVH (LEFT VENTRICULAR HYPERTROPHY) DUE TO HYPERTENSIVE DISEASE: Status: ACTIVE | Noted: 2022-03-18

## 2024-02-23 PROBLEM — I47.10 PAROXYSMAL SVT (SUPRAVENTRICULAR TACHYCARDIA): Status: ACTIVE | Noted: 2024-02-23

## 2024-02-23 NOTE — ASSESSMENT
[FreeTextEntry1] : # SVT/presyncope - ECG wnl - 2/18/22 Event Monitor wnl - 3/2/22 TTE c/w nl LV/RV fxn - labs reviewed in detail - 2/1/24 TTE c/w nl LV sys fxn - results discussed in detail with pt  # HTN - resolved - BP today 120/70 - pt reports he self d/c'd losartan and his been monitoring his BPs at home which have been wnl - 3/2/22 TTE c/w mild LVH - TTE done 6/10/22 w/ resolution of LVH - ETT wnl - 1/6/23 CCTA c/w nl cors, CACS 0 - results discussed in detail with pt  # LVH - 3/2/22 TTE c/w mild LVH - repeat TTE c/w no LVH, nl sys fxn - results discussed in detail with pt  RTC 1 year  Spent 11 minutes on telehealth/phone visit, all questions answered in detail.

## 2024-02-23 NOTE — HISTORY OF PRESENT ILLNESS
[FreeTextEntry1] : 29M w/ h/o SVT who was seen in the [facility] ED for a sustained SVT in the setting of dehydration. States the SVT broke before he even got to the ER. States he was pre-syncopal for ~20 seconds but did not fall or experience LOC. NO associated chest pain . No associated nausea, vomiting or diaphoresis. No new meds. Event monitor wnl and TTE c/w LVH. Returned for 6 month f/u and and c/o chest pain; subsequent TTE and ETT wnl. Returned for 6m f/u w/ complaints of progressive chest pain on rest and exertion. No associated nausea, vomiting or diaphoresis.   2/5/24 TH: pt returns today via TH to discuss results of labs and TTE, No new complaints.  1/19/24 OV: pt returns for 1 year f/u. Does endorse infrequent episodes of palpitations. Otherwise feeling well, chest pain or CASTILLO. Reports he stopped taking the losartan back in August 2023 and has been monitoring his BPs at home which have been normal. 12/30/22 OV: returns for 6m f/u and now w/ complaints of chest pain on rest and exertion. No associated nausea, vomiting or diaphoresis. 6/17/22 OV: returns for 3 month f/u and and repeat TTE results after starting an ARB. Now w/ complaints of chest pain on rest and exertion. No associated nausea, vomiting or diaphoresis. 3/18/22 TH: returns today via TH visit for results of TTE and Event monitor. No new complaints.  1/19/24 ECG: NSR at 69 bpm, nl axis 12/30/22 ECG: NSR at 75 bpm, nl axis, IRBBB, rate variation 6/17/22 ECG: Sinus tachycardia at 103 bpm with sinus arrhythmia and NS STC 2/18/22 ECG: NSR at 76 bpm, nl axis

## 2024-02-23 NOTE — HISTORY OF PRESENT ILLNESS
[FreeTextEntry1] : 29M w/ h/o SVT who was seen in the [facility] ED for a sustained SVT in the setting of dehydration. States the SVT broke before he even got to the ER. States he was pre-syncopal for ~20 seconds but did not fall or experience LOC. NO associated chest pain . No associated nausea, vomiting or diaphoresis. No new meds. Event monitor wnl and TTE c/w LVH. Returned for 6 month f/u and and c/o chest pain; subsequent TTE and ETT wnl. Returned for 6m f/u w/ complaints of progressive chest pain on rest and exertion. No associated nausea, vomiting or diaphoresis.   1/19/24 OV: pt returns for 1 year f/u. Does endorse infrequent episodes of palpitations. Otherwise feeling well, denies chest pain or CASTILLO. Reports he stopped taking the losartan back in August 2023 and has been monitoring his BPs at home which have been normal. 12/30/22 OV: returns for 6m f/u and now w/ complaints of chest pain on rest and exertion. No associated nausea, vomiting or diaphoresis. 6/17/22 OV: returns for 3 month f/u and and repeat TTE results after starting an ARB. Now w/ complaints of chest pain on rest and exertion. No associated nausea, vomiting or diaphoresis. 3/18/22 TH: returns today via TH visit for results of TTE and Event monitor. No new complaints.  1/19/24 ECG: NSR at 69 bpm, nl axis 12/30/22 ECG: NSR at 75 bpm, nl axis, IRBBB, rate variation 6/17/22 ECG: Sinus tachycardia at 103 bpm with sinus arrhythmia and NS STC 2/18/22 ECG: NSR at 76 bpm, nl axis

## 2024-02-23 NOTE — ASSESSMENT
[FreeTextEntry1] : # SVT/presyncope- still w/ infrequent palpitations  - ECG wnl - 2/18/22 Event Monitor wnl - 3/2/22 TTE c/w nl LV/RV fxn - check TTE and labs  # HTN - BP today 120/70 - pt reports he self d/c'd losartan and his been monitoring his BPs at home which have been wnl - 3/2/22 TTE c/w mild LVH - repeat TTE done 6/10/22 w/ resolution of LVH - ETT wnl - 1/6/23 CCTA c/w nl cors, CACS 0 - results discussed in detail with pt  RTC 2 weeks via TH

## 2024-08-12 NOTE — ASSESSMENT
Followup with your PCP for re-evaluation and repeat sonography; seek care for worsening symptoms.   [FreeTextEntry1] : SVT/presyncope\par - ECG wnl\par - 2/18/22 Event Monitor wnl\par - results discussed in detail with pt \par - 3/2/22 TTE c/w nl LV/RV fxn\par - labs reviewed, results discussed in detail, wnl\par \par \par HTN\par - uncontrolled, states he has white coat HTN, home /80\par - 3/2/22 TTE c/w mild LVH\par - results discussed in detail with pt \par - started losartan 25 qd, ed done re AE/SE\par - ed done re Na restriction \par - repeat TTE done 6/10/22 w/ resolution of LVH\par \par chest pain - now progressive\par - TTE wnl\par - ETT wnl\par - 1/6/23 CCTA c/w nl cors, CAC 0\par - results discussed in detail with pt, reassured\par \par RTC 1 year\par Spent 11 minutes on telehealth/phone visit, all questions answered in detail. \par \par

## 2024-09-23 NOTE — REVIEW OF SYSTEMS
[Negative] : Heme/Lymph [facility]  [address], [address]  [place] [address]  Phone: [phone]  Fax: [phone]

## 2025-02-07 ENCOUNTER — NON-APPOINTMENT (OUTPATIENT)
Age: 30
End: 2025-02-07

## 2025-02-07 ENCOUNTER — APPOINTMENT (OUTPATIENT)
Dept: HEART AND VASCULAR | Facility: CLINIC | Age: 30
End: 2025-02-07

## 2025-02-07 VITALS
OXYGEN SATURATION: 99 % | SYSTOLIC BLOOD PRESSURE: 122 MMHG | BODY MASS INDEX: 20.61 KG/M2 | TEMPERATURE: 98.2 F | HEIGHT: 68 IN | HEART RATE: 51 BPM | WEIGHT: 136 LBS | DIASTOLIC BLOOD PRESSURE: 70 MMHG

## 2025-02-07 DIAGNOSIS — I47.10 SUPRAVENTRICULAR TACHYCARDIA, UNSPECIFIED: ICD-10-CM

## 2025-02-07 DIAGNOSIS — R00.2 PALPITATIONS: ICD-10-CM

## 2025-02-07 DIAGNOSIS — R07.9 CHEST PAIN, UNSPECIFIED: ICD-10-CM

## 2025-02-07 DIAGNOSIS — I11.9 HYPERTENSIVE HEART DISEASE W/OUT HEART FAILURE: ICD-10-CM

## 2025-02-07 PROCEDURE — G2211 COMPLEX E/M VISIT ADD ON: CPT | Mod: NC

## 2025-02-07 PROCEDURE — 93000 ELECTROCARDIOGRAM COMPLETE: CPT

## 2025-02-07 PROCEDURE — 99214 OFFICE O/P EST MOD 30 MIN: CPT

## 2025-02-10 LAB
ALBUMIN SERPL ELPH-MCNC: 4.7 G/DL
ALP BLD-CCNC: 88 U/L
ALT SERPL-CCNC: 14 U/L
ANION GAP SERPL CALC-SCNC: 16 MMOL/L
AST SERPL-CCNC: 16 U/L
BASOPHILS # BLD AUTO: 0.05 K/UL
BASOPHILS NFR BLD AUTO: 1 %
BILIRUB SERPL-MCNC: 0.4 MG/DL
BUN SERPL-MCNC: 11 MG/DL
CALCIUM SERPL-MCNC: 10 MG/DL
CHLORIDE SERPL-SCNC: 102 MMOL/L
CHOLEST SERPL-MCNC: 149 MG/DL
CO2 SERPL-SCNC: 23 MMOL/L
CREAT SERPL-MCNC: 0.88 MG/DL
EGFR: 119 ML/MIN/1.73M2
EOSINOPHIL # BLD AUTO: 0.16 K/UL
EOSINOPHIL NFR BLD AUTO: 3.3 %
ESTIMATED AVERAGE GLUCOSE: 100 MG/DL
GLUCOSE SERPL-MCNC: 83 MG/DL
HBA1C MFR BLD HPLC: 5.1 %
HCT VFR BLD CALC: 47.1 %
HDLC SERPL-MCNC: 52 MG/DL
HGB BLD-MCNC: 15.4 G/DL
IMM GRANULOCYTES NFR BLD AUTO: 0.2 %
LDLC SERPL CALC-MCNC: 85 MG/DL
LYMPHOCYTES # BLD AUTO: 1.63 K/UL
LYMPHOCYTES NFR BLD AUTO: 33.5 %
MAN DIFF?: NORMAL
MCHC RBC-ENTMCNC: 30.7 PG
MCHC RBC-ENTMCNC: 32.7 G/DL
MCV RBC AUTO: 94 FL
MONOCYTES # BLD AUTO: 0.37 K/UL
MONOCYTES NFR BLD AUTO: 7.6 %
NEUTROPHILS # BLD AUTO: 2.64 K/UL
NEUTROPHILS NFR BLD AUTO: 54.4 %
NONHDLC SERPL-MCNC: 96 MG/DL
PLATELET # BLD AUTO: 327 K/UL
POTASSIUM SERPL-SCNC: 4.7 MMOL/L
PROT SERPL-MCNC: 7.3 G/DL
RBC # BLD: 5.01 M/UL
RBC # FLD: 12.8 %
SODIUM SERPL-SCNC: 141 MMOL/L
TRIGL SERPL-MCNC: 52 MG/DL
TSH SERPL-ACNC: 1.61 UIU/ML
WBC # FLD AUTO: 4.86 K/UL

## 2025-02-19 ENCOUNTER — APPOINTMENT (OUTPATIENT)
Dept: HEART AND VASCULAR | Facility: CLINIC | Age: 30
End: 2025-02-19
Payer: COMMERCIAL

## 2025-02-19 PROCEDURE — 93306 TTE W/DOPPLER COMPLETE: CPT

## 2025-02-24 ENCOUNTER — APPOINTMENT (OUTPATIENT)
Dept: HEART AND VASCULAR | Facility: CLINIC | Age: 30
End: 2025-02-24
Payer: COMMERCIAL

## 2025-02-24 DIAGNOSIS — R00.2 PALPITATIONS: ICD-10-CM

## 2025-02-24 DIAGNOSIS — R55 SYNCOPE AND COLLAPSE: ICD-10-CM

## 2025-02-24 DIAGNOSIS — I47.10 SUPRAVENTRICULAR TACHYCARDIA, UNSPECIFIED: ICD-10-CM

## 2025-02-24 PROCEDURE — 99213 OFFICE O/P EST LOW 20 MIN: CPT | Mod: 93

## 2025-02-24 PROCEDURE — G2211 COMPLEX E/M VISIT ADD ON: CPT | Mod: NC,93
